# Patient Record
Sex: FEMALE | Race: WHITE | NOT HISPANIC OR LATINO | URBAN - METROPOLITAN AREA
[De-identification: names, ages, dates, MRNs, and addresses within clinical notes are randomized per-mention and may not be internally consistent; named-entity substitution may affect disease eponyms.]

---

## 2017-12-21 ENCOUNTER — INPATIENT (INPATIENT)
Facility: HOSPITAL | Age: 62
LOS: 0 days | Discharge: HOME | DRG: 381 | End: 2017-12-22
Attending: INTERNAL MEDICINE | Admitting: INTERNAL MEDICINE
Payer: COMMERCIAL

## 2017-12-21 VITALS
OXYGEN SATURATION: 100 % | RESPIRATION RATE: 16 BRPM | SYSTOLIC BLOOD PRESSURE: 136 MMHG | DIASTOLIC BLOOD PRESSURE: 89 MMHG | HEART RATE: 93 BPM | WEIGHT: 149.91 LBS | TEMPERATURE: 98 F

## 2017-12-21 DIAGNOSIS — Z98.89 OTHER SPECIFIED POSTPROCEDURAL STATES: Chronic | ICD-10-CM

## 2017-12-21 LAB
ALBUMIN SERPL ELPH-MCNC: 4 G/DL — SIGNIFICANT CHANGE UP (ref 3.3–5)
ALP SERPL-CCNC: 117 U/L — SIGNIFICANT CHANGE UP (ref 40–120)
ALT FLD-CCNC: 34 U/L — SIGNIFICANT CHANGE UP (ref 10–45)
ANION GAP SERPL CALC-SCNC: 16 MMOL/L — SIGNIFICANT CHANGE UP (ref 5–17)
APTT BLD: 34.1 SEC — SIGNIFICANT CHANGE UP (ref 27.5–37.4)
AST SERPL-CCNC: 25 U/L — SIGNIFICANT CHANGE UP (ref 10–40)
BASOPHILS NFR BLD AUTO: 0.9 % — SIGNIFICANT CHANGE UP (ref 0–2)
BILIRUB SERPL-MCNC: 0.4 MG/DL — SIGNIFICANT CHANGE UP (ref 0.2–1.2)
BUN SERPL-MCNC: 14 MG/DL — SIGNIFICANT CHANGE UP (ref 7–23)
CALCIUM SERPL-MCNC: 9.4 MG/DL — SIGNIFICANT CHANGE UP (ref 8.4–10.5)
CHLORIDE SERPL-SCNC: 105 MMOL/L — SIGNIFICANT CHANGE UP (ref 96–108)
CK MB CFR SERPL CALC: <1 NG/ML — SIGNIFICANT CHANGE UP (ref 0–6.7)
CK SERPL-CCNC: 73 U/L — SIGNIFICANT CHANGE UP (ref 25–170)
CO2 SERPL-SCNC: 23 MMOL/L — SIGNIFICANT CHANGE UP (ref 22–31)
CREAT SERPL-MCNC: 0.69 MG/DL — SIGNIFICANT CHANGE UP (ref 0.5–1.3)
EOSINOPHIL NFR BLD AUTO: 0.6 % — SIGNIFICANT CHANGE UP (ref 0–6)
GLUCOSE SERPL-MCNC: 106 MG/DL — HIGH (ref 70–99)
HCT VFR BLD CALC: 44 % — SIGNIFICANT CHANGE UP (ref 34.5–45)
HGB BLD-MCNC: 14.7 G/DL — SIGNIFICANT CHANGE UP (ref 11.5–15.5)
INR BLD: 1 — SIGNIFICANT CHANGE UP (ref 0.88–1.16)
LYMPHOCYTES # BLD AUTO: 24 % — SIGNIFICANT CHANGE UP (ref 13–44)
MCHC RBC-ENTMCNC: 26 PG — LOW (ref 27–34)
MCHC RBC-ENTMCNC: 33.4 G/DL — SIGNIFICANT CHANGE UP (ref 32–36)
MCV RBC AUTO: 77.9 FL — LOW (ref 80–100)
MONOCYTES NFR BLD AUTO: 5.5 % — SIGNIFICANT CHANGE UP (ref 2–14)
NEUTROPHILS NFR BLD AUTO: 69 % — SIGNIFICANT CHANGE UP (ref 43–77)
PLATELET # BLD AUTO: 255 K/UL — SIGNIFICANT CHANGE UP (ref 150–400)
POTASSIUM SERPL-MCNC: 3.7 MMOL/L — SIGNIFICANT CHANGE UP (ref 3.5–5.3)
POTASSIUM SERPL-SCNC: 3.7 MMOL/L — SIGNIFICANT CHANGE UP (ref 3.5–5.3)
PROT SERPL-MCNC: 7.2 G/DL — SIGNIFICANT CHANGE UP (ref 6–8.3)
PROTHROM AB SERPL-ACNC: 11.1 SEC — SIGNIFICANT CHANGE UP (ref 9.8–12.7)
RBC # BLD: 5.65 M/UL — HIGH (ref 3.8–5.2)
RBC # FLD: 17.2 % — HIGH (ref 10.3–16.9)
SODIUM SERPL-SCNC: 144 MMOL/L — SIGNIFICANT CHANGE UP (ref 135–145)
TROPONIN T SERPL-MCNC: <0.01 NG/ML — SIGNIFICANT CHANGE UP (ref 0–0.01)
WBC # BLD: 10 K/UL — SIGNIFICANT CHANGE UP (ref 3.8–10.5)
WBC # FLD AUTO: 10 K/UL — SIGNIFICANT CHANGE UP (ref 3.8–10.5)

## 2017-12-21 PROCEDURE — 99223 1ST HOSP IP/OBS HIGH 75: CPT | Mod: GC

## 2017-12-21 PROCEDURE — 93010 ELECTROCARDIOGRAM REPORT: CPT | Mod: NC

## 2017-12-21 PROCEDURE — 99285 EMERGENCY DEPT VISIT HI MDM: CPT | Mod: 25

## 2017-12-21 PROCEDURE — 71020: CPT | Mod: 26

## 2017-12-21 PROCEDURE — 99223 1ST HOSP IP/OBS HIGH 75: CPT

## 2017-12-21 PROCEDURE — 74220 X-RAY XM ESOPHAGUS 1CNTRST: CPT | Mod: 26

## 2017-12-21 RX ORDER — DEXTROSE 50 % IN WATER 50 %
25 SYRINGE (ML) INTRAVENOUS ONCE
Qty: 0 | Refills: 0 | Status: DISCONTINUED | OUTPATIENT
Start: 2017-12-21 | End: 2017-12-22

## 2017-12-21 RX ORDER — ONDANSETRON 8 MG/1
4 TABLET, FILM COATED ORAL ONCE
Qty: 0 | Refills: 0 | Status: COMPLETED | OUTPATIENT
Start: 2017-12-21 | End: 2017-12-21

## 2017-12-21 RX ORDER — GLUCAGON INJECTION, SOLUTION 0.5 MG/.1ML
0.5 INJECTION, SOLUTION SUBCUTANEOUS ONCE
Qty: 0 | Refills: 0 | Status: COMPLETED | OUTPATIENT
Start: 2017-12-21 | End: 2017-12-21

## 2017-12-21 RX ORDER — HEPARIN SODIUM 5000 [USP'U]/ML
5000 INJECTION INTRAVENOUS; SUBCUTANEOUS EVERY 8 HOURS
Qty: 0 | Refills: 0 | Status: DISCONTINUED | OUTPATIENT
Start: 2017-12-21 | End: 2017-12-22

## 2017-12-21 RX ORDER — INSULIN LISPRO 100/ML
VIAL (ML) SUBCUTANEOUS
Qty: 0 | Refills: 0 | Status: DISCONTINUED | OUTPATIENT
Start: 2017-12-21 | End: 2017-12-22

## 2017-12-21 RX ORDER — SODIUM CHLORIDE 9 MG/ML
1000 INJECTION INTRAMUSCULAR; INTRAVENOUS; SUBCUTANEOUS
Qty: 0 | Refills: 0 | Status: DISCONTINUED | OUTPATIENT
Start: 2017-12-21 | End: 2017-12-22

## 2017-12-21 RX ORDER — SODIUM CHLORIDE 9 MG/ML
1000 INJECTION, SOLUTION INTRAVENOUS
Qty: 0 | Refills: 0 | Status: DISCONTINUED | OUTPATIENT
Start: 2017-12-21 | End: 2017-12-22

## 2017-12-21 RX ORDER — GLUCAGON INJECTION, SOLUTION 0.5 MG/.1ML
1 INJECTION, SOLUTION SUBCUTANEOUS ONCE
Qty: 0 | Refills: 0 | Status: DISCONTINUED | OUTPATIENT
Start: 2017-12-21 | End: 2017-12-22

## 2017-12-21 RX ORDER — DEXTROSE 50 % IN WATER 50 %
1 SYRINGE (ML) INTRAVENOUS ONCE
Qty: 0 | Refills: 0 | Status: DISCONTINUED | OUTPATIENT
Start: 2017-12-21 | End: 2017-12-22

## 2017-12-21 RX ORDER — DEXTROSE 50 % IN WATER 50 %
12.5 SYRINGE (ML) INTRAVENOUS ONCE
Qty: 0 | Refills: 0 | Status: DISCONTINUED | OUTPATIENT
Start: 2017-12-21 | End: 2017-12-22

## 2017-12-21 RX ADMIN — GLUCAGON INJECTION, SOLUTION 0.5 MILLIGRAM(S): 0.5 INJECTION, SOLUTION SUBCUTANEOUS at 21:58

## 2017-12-21 RX ADMIN — SODIUM CHLORIDE 125 MILLILITER(S): 9 INJECTION INTRAMUSCULAR; INTRAVENOUS; SUBCUTANEOUS at 21:25

## 2017-12-21 RX ADMIN — HEPARIN SODIUM 5000 UNIT(S): 5000 INJECTION INTRAVENOUS; SUBCUTANEOUS at 22:36

## 2017-12-21 RX ADMIN — ONDANSETRON 4 MILLIGRAM(S): 8 TABLET, FILM COATED ORAL at 21:41

## 2017-12-21 NOTE — ED PROVIDER NOTE - ENMT, MLM
Airway patent, Nasal mucosa clear. Mouth with normal mucosa. no FB visualized in mouth. Tolerating secretions w/o difficulty. No drooling or stridor

## 2017-12-21 NOTE — H&P ADULT - NSHPLABSRESULTS_GEN_ALL_CORE
14.7   10.0  )-----------( 255      ( 21 Dec 2017 15:58 )             44.0     12-21    144  |  105  |  14  ----------------------------<  106<H>  3.7   |  23  |  0.69    Ca    9.4      21 Dec 2017 15:58    TPro  7.2  /  Alb  4.0  /  TBili  0.4  /  DBili  x   /  AST  25  /  ALT  34  /  AlkPhos  117  12-21    PT/INR - ( 21 Dec 2017 15:58 )   PT: 11.1 sec;   INR: 1.00          PTT - ( 21 Dec 2017 15:58 )  PTT:34.1 sec    CARDIAC MARKERS ( 21 Dec 2017 15:58 )  x     / <0.01 ng/mL / 73 U/L / x     / <1.0 ng/mL      RADIOLOGY, EKG & ADDITIONAL TESTS: Reviewed.  < from: Xray Chest 2 Views PA/Lat (12.21.17 @ 16:55) >    No active disease in the chest. Mild elevation of the left hemidiaphragm, of unclear etiology. Previous hiatal hernia is not identified on the current exam. Correlate with patient's surgical history.    < from: Xray Esophagram (12.21.17 @ 18:15) >    No passage of contrast from the distal esophagus into the stomach. The   patient regurgitated the contrast material and the study could not be   completed. No definite hiatal hernia was identified. Recommend CT of the   chest to rule out obstructing mass.    < end of copied text >    EKG: NSR

## 2017-12-21 NOTE — ED ADULT NURSE REASSESSMENT NOTE - NS ED NURSE REASSESS COMMENT FT1
Pt currently spitting up clear liquid. VS stable at this time. Suction set up at bedside. Dr. Hamilton endorsed. Verbal order of Zofran 4mg given IV. GI consult currently at bedside. Awaiting further orders. Will continue to monitor. Primary RN Joe endorsed.

## 2017-12-21 NOTE — H&P ADULT - NSHPPHYSICALEXAM_GEN_ALL_CORE
Constitutional: WDWN resting comfortably in bed; NAD  Head: NC/AT  Eyes: PERRL, EOMI, anicteric sclera  ENT: no nasal discharge; uvula midline, no oropharyngeal erythema or exudates; MMM  Neck: supple; no JVD or thyromegaly  Respiratory: CTA B/L; no W/R/R, no retractions. no increased work of breathing. able to speak in full sentences and tolerating secretions well  Cardiac: +S1/S2; RRR; no M/R/G; PMI non-displaced  Gastrointestinal: epigastric tenderness. No food particles noted in oropharynx. abd was soft, ND; no rebound or guarding; +BSx4  Back: spine midline, no bony tenderness or step-offs; no CVAT B/L  Extremities: WWP, no clubbing or cyanosis; no peripheral edema  Musculoskeletal: NROM x4; no joint swelling, tenderness or erythema  Vascular: 2+ radial, femoral, DP/PT pulses B/L  Dermatologic: skin warm, dry and intact; no rashes, wounds, or scars  Lymphatic: no submandibular or cervical LAD  Neurologic: AAOx3; CNII-XII grossly intact; no focal deficits  Psychiatric: affect and characteristics of appearance, verbalizations, behaviors are appropriate

## 2017-12-21 NOTE — ED ADULT NURSE NOTE - OBJECTIVE STATEMENT
61y F, A&ox3, presents to ED for chest pressure x2 days after eating chicken wings. States pressure to chest midsternal worsening after eating radiating to back, pt reports removal of galbladder previously. No active n/v, no sob, no fever, no chills, no cough, no numbness nor tingling. Lungs clear bilateral, no jvd, no edema. +pedal pulses.

## 2017-12-21 NOTE — H&P ADULT - PROBLEM SELECTOR PLAN 1
Patient is comfortable, tolerating secretions and without any respiratory distress.  -NPO, aspiration precautions  -0.5mg glucagon q1hr PRN  -Zofran PRN (QTc 447)  -EGD in AM Patient is comfortable, tolerating secretions and without any respiratory distress. Etiology includes strictures from long-standing GERD, autoimmune process (pemphigus), obstruction from recurrent hiatal hernia or a new mass. Other etiologies include Schatzki ring, esophageal web, achalasia or spasm  -NPO, aspiration precautions  -0.5mg glucagon q1hr PRN  -Zofran PRN (QTc 447)  -EGD in AM

## 2017-12-21 NOTE — ED PROVIDER NOTE - OBJECTIVE STATEMENT
Pt w/ PMHx pemphigus vulgaris, GERD, HLD, renal colic, steroid-induced DM, PSHx cholecystectomy, hiatal hernia repair (2014) p/w inability to swallow x 2 days. Pt reports whenever she eats solids, it feels stuck in her esophagus, and feels associated pressure in her substernal chest and back. Pt reports she can swallow liquids, as well as bread, and fruit. This evening, pt ate a single bite of steak. Pt had the same painful sensation, and just minutes to arrival, pt spit up a large (approx 4 cm) piece of steak (in emesis basin next to pt). Pt reports since she spit this up, she is feeling much better, and has no pain. No drooling, stridor. No prior hx.

## 2017-12-21 NOTE — H&P ADULT - HISTORY OF PRESENT ILLNESS
Patient is a 61 year old female with a PMHx of DM, HLD, osteoporosis and GERD who presented to the ED with Patient is a 61 year old female with a PMHx of a strangulated hiatal hernia s/p repair several years ago, DM, HLD, osteoporosis and GERD who presented to the ED with two days of dysphagia. The patient reports noticing difficulty swallowing food two days ago that has since progressed to liquids today. Today she ate two pieces of small steak and immediately felt that something was stuck in her throat. She came to the ED and immediately vomited up one piece and complained that she still felt something stuck in her chest. The patient also described a pain in her back that she thinks is related to the food.    In the ED, VS were T 97.8 HR 93 /89 RR 16 O2 100 on RA. Xray esophagram revealed no passage of contrast from the distal esophagus into the stomach. She regurgitated the contrast material so the study could not be completed. No definite hiatal hernia was identified. Her symptoms worsened immediately after esophagram but improved with IV glucagon. GI evaluated the patient and will plan for EGD in the AM.

## 2017-12-21 NOTE — H&P ADULT - PROBLEM SELECTOR PLAN 2
Patient reports having EGD several years ago and diagnosed her with a strangulated hiatal hernia that has since been repaired. Symptoms have been controlled with omeprazole (currently holding while NPO

## 2017-12-21 NOTE — CONSULT NOTE ADULT - SUBJECTIVE AND OBJECTIVE BOX
HPI:  Patient is a 61 year old female with a PMHx of DM, HLD, osteoporosis and GERD who presented to the ED with for in ability to tolerate PO. Pt states she had steak yesterday, and felt as if it was lodged into her throat. She was able to tolerate liquids and soft foods but still felt globus sensation today.  While in ER she was able to tolerate liquids, but after barium esophagram she felt as if she could not tolerate anything PO. she denies CP, SOB. While in ER she tolerated water but then felt like she needed to vomit.     of note, pt reports similar episodes to meats that self resolve       PAST MEDICAL & SURGICAL HISTORY:  Renal colic  Hypercholesterolemia  Osteoporosis  Pemphigus vulgaris  Reflux  Diabetes  H/O hernia repair  History of cholecystectomy    	    MEDICATIONS  (STANDING):  dextrose 5%. 1000 milliLiter(s) (50 mL/Hr) IV Continuous <Continuous>  dextrose 50% Injectable 12.5 Gram(s) IV Push once  dextrose 50% Injectable 25 Gram(s) IV Push once  dextrose 50% Injectable 25 Gram(s) IV Push once  glucagon  Injectable 0.5 milliGRAM(s) IV Push once  heparin  Injectable 5000 Unit(s) SubCutaneous every 8 hours  insulin lispro (HumaLOG) corrective regimen sliding scale   SubCutaneous Before meals and at bedtime  LORazepam   Injectable 0.5 milliGRAM(s) IV Push once  sodium chloride 0.9%. 1000 milliLiter(s) (125 mL/Hr) IV Continuous <Continuous>    MEDICATIONS  (PRN):  dextrose Gel 1 Dose(s) Oral once PRN Blood Glucose LESS THAN 70 milliGRAM(s)/deciliter  glucagon  Injectable 1 milliGRAM(s) IntraMuscular once PRN Glucose LESS THAN 70 milligrams/deciliter      Allergies    propylthiouracil (Hives)    Intolerances        SOCIAL HISTORY:    FAMILY HISTORY:  No pertinent family history in first degree relatives      Vital Signs Last 24 Hrs  T(C): 36.7 (21 Dec 2017 21:26), Max: 36.7 (21 Dec 2017 19:37)  T(F): 98.1 (21 Dec 2017 21:26), Max: 98.1 (21 Dec 2017 21:26)  HR: 90 (21 Dec 2017 21:26) (90 - 93)  BP: 145/87 (21 Dec 2017 21:26) (120/84 - 145/87)  BP(mean): --  RR: 16 (21 Dec 2017 21:26) (16 - 16)  SpO2: 97% (21 Dec 2017 21:26) (97% - 100%)    PHYSICAL EXAM:    GEN: Anxious, AOx3  HEENTL anicteric  CHEST: no w.r.r  CVS: no m/r/g  ABD: soft, nt nd bs+    LABS:                        14.7   10.0  )-----------( 255      ( 21 Dec 2017 15:58 )             44.0     12-21    144  |  105  |  14  ----------------------------<  106<H>  3.7   |  23  |  0.69    Ca    9.4      21 Dec 2017 15:58    TPro  7.2  /  Alb  4.0  /  TBili  0.4  /  DBili  x   /  AST  25  /  ALT  34  /  AlkPhos  117  12-21    PT/INR - ( 21 Dec 2017 15:58 )   PT: 11.1 sec;   INR: 1.00          PTT - ( 21 Dec 2017 15:58 )  PTT:34.1 sec      RADIOLOGY & ADDITIONAL STUDIES:

## 2017-12-21 NOTE — H&P ADULT - NSHPREVIEWOFSYSTEMS_GEN_ALL_CORE
CONSTITUTIONAL: No weakness, fevers or chills  EYES/ENT: No visual changes;  No vertigo or throat pain   NECK: No pain or stiffness  RESPIRATORY: No cough, wheezing, hemoptysis; No shortness of breath  CARDIOVASCULAR: No chest pain or palpitations  GASTROINTESTINAL: see HPI   GENITOURINARY: No dysuria, frequency or hematuria  NEUROLOGICAL: No numbness or weakness  SKIN: No itching, burning, rashes, or lesions   All other review of systems is negative unless indicated above.

## 2017-12-21 NOTE — H&P ADULT - ASSESSMENT
Patient is a 61 year old female with a PMHx of a strangulated hiatal hernia s/p repair several years ago, DM, HLD, osteoporosis and GERD who presented to the ED with food impaction

## 2017-12-21 NOTE — ED PROVIDER NOTE - PROGRESS NOTE DETAILS
D/w GI fellow Bob: hold on CT. pt needs EGD. Keep NPO. For EGD in the morning Pt now w/ vomiting, same pain, feels something else is stuck there. Pt now reports she ate 2 pieces of meat. GI re-called. Requested evaluation for possible EGD tonight. Pt to come in and see the pt

## 2017-12-22 ENCOUNTER — TRANSCRIPTION ENCOUNTER (OUTPATIENT)
Age: 62
End: 2017-12-22

## 2017-12-22 ENCOUNTER — RESULT REVIEW (OUTPATIENT)
Age: 62
End: 2017-12-22

## 2017-12-22 VITALS
HEART RATE: 93 BPM | TEMPERATURE: 98 F | OXYGEN SATURATION: 94 % | SYSTOLIC BLOOD PRESSURE: 120 MMHG | RESPIRATION RATE: 18 BRPM | DIASTOLIC BLOOD PRESSURE: 74 MMHG

## 2017-12-22 DIAGNOSIS — R13.10 DYSPHAGIA, UNSPECIFIED: ICD-10-CM

## 2017-12-22 DIAGNOSIS — K21.9 GASTRO-ESOPHAGEAL REFLUX DISEASE WITHOUT ESOPHAGITIS: ICD-10-CM

## 2017-12-22 DIAGNOSIS — E11.9 TYPE 2 DIABETES MELLITUS WITHOUT COMPLICATIONS: ICD-10-CM

## 2017-12-22 DIAGNOSIS — E03.9 HYPOTHYROIDISM, UNSPECIFIED: ICD-10-CM

## 2017-12-22 DIAGNOSIS — L10.0 PEMPHIGUS VULGARIS: ICD-10-CM

## 2017-12-22 DIAGNOSIS — M81.0 AGE-RELATED OSTEOPOROSIS WITHOUT CURRENT PATHOLOGICAL FRACTURE: ICD-10-CM

## 2017-12-22 DIAGNOSIS — E78.00 PURE HYPERCHOLESTEROLEMIA, UNSPECIFIED: ICD-10-CM

## 2017-12-22 DIAGNOSIS — K44.9 DIAPHRAGMATIC HERNIA WITHOUT OBSTRUCTION OR GANGRENE: ICD-10-CM

## 2017-12-22 DIAGNOSIS — Z29.9 ENCOUNTER FOR PROPHYLACTIC MEASURES, UNSPECIFIED: ICD-10-CM

## 2017-12-22 LAB
ANION GAP SERPL CALC-SCNC: 14 MMOL/L — SIGNIFICANT CHANGE UP (ref 5–17)
APTT BLD: 33 SEC — SIGNIFICANT CHANGE UP (ref 27.5–37.4)
BASOPHILS NFR BLD AUTO: 0.5 % — SIGNIFICANT CHANGE UP (ref 0–2)
BUN SERPL-MCNC: 17 MG/DL — SIGNIFICANT CHANGE UP (ref 7–23)
CALCIUM SERPL-MCNC: 8.4 MG/DL — SIGNIFICANT CHANGE UP (ref 8.4–10.5)
CHLORIDE SERPL-SCNC: 107 MMOL/L — SIGNIFICANT CHANGE UP (ref 96–108)
CO2 SERPL-SCNC: 25 MMOL/L — SIGNIFICANT CHANGE UP (ref 22–31)
CREAT SERPL-MCNC: 0.7 MG/DL — SIGNIFICANT CHANGE UP (ref 0.5–1.3)
EOSINOPHIL NFR BLD AUTO: 0.9 % — SIGNIFICANT CHANGE UP (ref 0–6)
GLUCOSE SERPL-MCNC: 71 MG/DL — SIGNIFICANT CHANGE UP (ref 70–99)
HBA1C BLD-MCNC: 5.8 % — HIGH (ref 4–5.6)
HCT VFR BLD CALC: 38.9 % — SIGNIFICANT CHANGE UP (ref 34.5–45)
HCV AB S/CO SERPL IA: 0.14 S/CO — SIGNIFICANT CHANGE UP
HCV AB SERPL-IMP: SIGNIFICANT CHANGE UP
HGB BLD-MCNC: 12.7 G/DL — SIGNIFICANT CHANGE UP (ref 11.5–15.5)
INR BLD: 1.12 — SIGNIFICANT CHANGE UP (ref 0.88–1.16)
LYMPHOCYTES # BLD AUTO: 26.2 % — SIGNIFICANT CHANGE UP (ref 13–44)
MAGNESIUM SERPL-MCNC: 2.1 MG/DL — SIGNIFICANT CHANGE UP (ref 1.6–2.6)
MCHC RBC-ENTMCNC: 25.9 PG — LOW (ref 27–34)
MCHC RBC-ENTMCNC: 32.6 G/DL — SIGNIFICANT CHANGE UP (ref 32–36)
MCV RBC AUTO: 79.2 FL — LOW (ref 80–100)
MONOCYTES NFR BLD AUTO: 7.8 % — SIGNIFICANT CHANGE UP (ref 2–14)
NEUTROPHILS NFR BLD AUTO: 64.6 % — SIGNIFICANT CHANGE UP (ref 43–77)
PLATELET # BLD AUTO: 222 K/UL — SIGNIFICANT CHANGE UP (ref 150–400)
POTASSIUM SERPL-MCNC: 3.6 MMOL/L — SIGNIFICANT CHANGE UP (ref 3.5–5.3)
POTASSIUM SERPL-SCNC: 3.6 MMOL/L — SIGNIFICANT CHANGE UP (ref 3.5–5.3)
PROTHROM AB SERPL-ACNC: 12.5 SEC — SIGNIFICANT CHANGE UP (ref 9.8–12.7)
RBC # BLD: 4.91 M/UL — SIGNIFICANT CHANGE UP (ref 3.8–5.2)
RBC # FLD: 17.5 % — HIGH (ref 10.3–16.9)
SODIUM SERPL-SCNC: 146 MMOL/L — HIGH (ref 135–145)
WBC # BLD: 9.4 K/UL — SIGNIFICANT CHANGE UP (ref 3.8–10.5)
WBC # FLD AUTO: 9.4 K/UL — SIGNIFICANT CHANGE UP (ref 3.8–10.5)

## 2017-12-22 PROCEDURE — 99285 EMERGENCY DEPT VISIT HI MDM: CPT | Mod: 25

## 2017-12-22 PROCEDURE — 93005 ELECTROCARDIOGRAM TRACING: CPT

## 2017-12-22 PROCEDURE — 84484 ASSAY OF TROPONIN QUANT: CPT

## 2017-12-22 PROCEDURE — 82553 CREATINE MB FRACTION: CPT

## 2017-12-22 PROCEDURE — 88305 TISSUE EXAM BY PATHOLOGIST: CPT

## 2017-12-22 PROCEDURE — 85610 PROTHROMBIN TIME: CPT

## 2017-12-22 PROCEDURE — 86803 HEPATITIS C AB TEST: CPT

## 2017-12-22 PROCEDURE — 36415 COLL VENOUS BLD VENIPUNCTURE: CPT

## 2017-12-22 PROCEDURE — 83735 ASSAY OF MAGNESIUM: CPT

## 2017-12-22 PROCEDURE — 71046 X-RAY EXAM CHEST 2 VIEWS: CPT

## 2017-12-22 PROCEDURE — 82550 ASSAY OF CK (CPK): CPT

## 2017-12-22 PROCEDURE — 85730 THROMBOPLASTIN TIME PARTIAL: CPT

## 2017-12-22 PROCEDURE — 74220 X-RAY XM ESOPHAGUS 1CNTRST: CPT

## 2017-12-22 PROCEDURE — 83036 HEMOGLOBIN GLYCOSYLATED A1C: CPT

## 2017-12-22 PROCEDURE — 80048 BASIC METABOLIC PNL TOTAL CA: CPT

## 2017-12-22 PROCEDURE — 80053 COMPREHEN METABOLIC PANEL: CPT

## 2017-12-22 PROCEDURE — 82962 GLUCOSE BLOOD TEST: CPT

## 2017-12-22 PROCEDURE — 85025 COMPLETE CBC W/AUTO DIFF WBC: CPT

## 2017-12-22 PROCEDURE — 99239 HOSP IP/OBS DSCHRG MGMT >30: CPT

## 2017-12-22 RX ORDER — CHOLECALCIFEROL (VITAMIN D3) 125 MCG
1 CAPSULE ORAL
Qty: 0 | Refills: 0 | COMMUNITY

## 2017-12-22 RX ORDER — ONDANSETRON 8 MG/1
4 TABLET, FILM COATED ORAL EVERY 6 HOURS
Qty: 0 | Refills: 0 | Status: DISCONTINUED | OUTPATIENT
Start: 2017-12-22 | End: 2017-12-22

## 2017-12-22 RX ORDER — GLUCAGON INJECTION, SOLUTION 0.5 MG/.1ML
0.5 INJECTION, SOLUTION SUBCUTANEOUS
Qty: 0 | Refills: 0 | Status: DISCONTINUED | OUTPATIENT
Start: 2017-12-22 | End: 2017-12-22

## 2017-12-22 RX ORDER — OMEPRAZOLE 10 MG/1
20 CAPSULE, DELAYED RELEASE ORAL
Qty: 0 | Refills: 0 | COMMUNITY

## 2017-12-22 RX ORDER — PANTOPRAZOLE SODIUM 20 MG/1
40 TABLET, DELAYED RELEASE ORAL DAILY
Qty: 0 | Refills: 0 | Status: DISCONTINUED | OUTPATIENT
Start: 2017-12-23 | End: 2017-12-22

## 2017-12-22 RX ORDER — TRAZODONE HCL 50 MG
50 TABLET ORAL
Qty: 0 | Refills: 0 | COMMUNITY

## 2017-12-22 RX ORDER — PANTOPRAZOLE SODIUM 20 MG/1
40 TABLET, DELAYED RELEASE ORAL ONCE
Qty: 0 | Refills: 0 | Status: COMPLETED | OUTPATIENT
Start: 2017-12-22 | End: 2017-12-22

## 2017-12-22 RX ORDER — DEXTROSE 50 % IN WATER 50 %
12.5 SYRINGE (ML) INTRAVENOUS ONCE
Qty: 0 | Refills: 0 | Status: DISCONTINUED | OUTPATIENT
Start: 2017-12-22 | End: 2017-12-22

## 2017-12-22 RX ORDER — LEVOTHYROXINE SODIUM 125 MCG
56 TABLET ORAL
Qty: 0 | Refills: 0 | Status: DISCONTINUED | OUTPATIENT
Start: 2017-12-22 | End: 2017-12-22

## 2017-12-22 RX ORDER — LEVOTHYROXINE SODIUM 125 MCG
112 TABLET ORAL
Qty: 0 | Refills: 0 | COMMUNITY

## 2017-12-22 RX ORDER — PANTOPRAZOLE SODIUM 20 MG/1
TABLET, DELAYED RELEASE ORAL
Qty: 0 | Refills: 0 | Status: DISCONTINUED | OUTPATIENT
Start: 2017-12-22 | End: 2017-12-22

## 2017-12-22 RX ORDER — SCOPALAMINE 1 MG/3D
1.5 PATCH, EXTENDED RELEASE TRANSDERMAL ONCE
Qty: 0 | Refills: 0 | Status: COMPLETED | OUTPATIENT
Start: 2017-12-22 | End: 2017-12-22

## 2017-12-22 RX ORDER — SIMVASTATIN 20 MG/1
40 TABLET, FILM COATED ORAL
Qty: 0 | Refills: 0 | COMMUNITY

## 2017-12-22 RX ORDER — LANOLIN ALCOHOL/MO/W.PET/CERES
1 CREAM (GRAM) TOPICAL
Qty: 0 | Refills: 0 | COMMUNITY

## 2017-12-22 RX ORDER — ASPIRIN/CALCIUM CARB/MAGNESIUM 324 MG
1 TABLET ORAL
Qty: 0 | Refills: 0 | COMMUNITY

## 2017-12-22 RX ORDER — LIRAGLUTIDE 6 MG/ML
6 INJECTION SUBCUTANEOUS
Qty: 0 | Refills: 0 | COMMUNITY

## 2017-12-22 RX ORDER — LACTOBACILLUS ACIDOPHILUS 100MM CELL
2 CAPSULE ORAL
Qty: 0 | Refills: 0 | COMMUNITY

## 2017-12-22 RX ADMIN — ONDANSETRON 4 MILLIGRAM(S): 8 TABLET, FILM COATED ORAL at 00:53

## 2017-12-22 RX ADMIN — SCOPALAMINE 1.5 MILLIGRAM(S): 1 PATCH, EXTENDED RELEASE TRANSDERMAL at 12:01

## 2017-12-22 RX ADMIN — PANTOPRAZOLE SODIUM 40 MILLIGRAM(S): 20 TABLET, DELAYED RELEASE ORAL at 15:30

## 2017-12-22 RX ADMIN — GLUCAGON INJECTION, SOLUTION 0.5 MILLIGRAM(S): 0.5 INJECTION, SOLUTION SUBCUTANEOUS at 01:35

## 2017-12-22 RX ADMIN — Medication 56 MICROGRAM(S): at 06:31

## 2017-12-22 RX ADMIN — GLUCAGON INJECTION, SOLUTION 0.5 MILLIGRAM(S): 0.5 INJECTION, SOLUTION SUBCUTANEOUS at 04:39

## 2017-12-22 RX ADMIN — HEPARIN SODIUM 5000 UNIT(S): 5000 INJECTION INTRAVENOUS; SUBCUTANEOUS at 06:31

## 2017-12-22 NOTE — CONSULT NOTE ADULT - ASSESSMENT
61 year old female with presents with dysphagia to solids after eating steak, with witnessed vomitus with steak. Pt will require endoscopic evaluation to r/o e.o.e    1. ? food impaction   - NPO  -Aspiration precautions  -0.5 mg glucagon q 1hrs  -1 mg ativan q 30 min  -encourage ambulation   -Plan for EGD     GI following
60 y/o female with previous hernia repair by Dr. Carlyle Espinosa, now here wtih dysphagia after eating steak; Xray esophagram significant for no passage of contrast past the distal esophagus.

## 2017-12-22 NOTE — DISCHARGE NOTE ADULT - PATIENT PORTAL LINK FT
“You can access the [employer] Patient Portal, offered by [facility], by registering with the following website: [email or web address]”

## 2017-12-22 NOTE — PROGRESS NOTE ADULT - PROBLEM SELECTOR PLAN 2
Patient reports having EGD several years ago and diagnosed her with a strangulated hiatal hernia that has since been repaired. Symptoms have been controlled with omeprazole  -IV PPI daily, plan to transition to PO

## 2017-12-22 NOTE — CONSULT NOTE ADULT - SUBJECTIVE AND OBJECTIVE BOX
Surgeon: Carlyle Siegel    Requesting Physician: Dr. Gleason    HISTORY OF PRESENT ILLNESS:  This is a 60 y/o female, known to Dr. Espinosa who performed her hernia repair "years ago".  We were notified that the patient is in the hospital with esophageal issues and will follow with the primary team while she is here.      From H&P:  She is a 61 year old female with a PMHx of a strangulated hiatal hernia s/p repair several years ago, DM, HLD, osteoporosis and GERD who presented to the ED with two days of dysphagia. The patient reports noticing difficulty swallowing food two days ago that has since progressed to liquids today. Today she ate two pieces of small steak and immediately felt that something was stuck in her throat. She came to the ED and immediately vomited up one piece and complained that she still felt something stuck in her chest. The patient also described a pain in her back that she thinks is related to the food.     Current HPI: An esophagram revealed no passage of contrast after the distal esophagus into the stomach.  Working diagnosis of food impaction.  Plan is for EGD today [facility].  Currently,     PAST MEDICAL & SURGICAL HISTORY:  Renal colic  Hypercholesterolemia  Osteoporosis  Pemphigus vulgaris  Reflux  Diabetes  H/O hernia repair  History of cholecystectomy      MEDICATIONS  (STANDING):  dextrose 5%. 1000 milliLiter(s) (50 mL/Hr) IV Continuous <Continuous>  dextrose 50% Injectable 12.5 Gram(s) IV Push once  dextrose 50% Injectable 25 Gram(s) IV Push once  dextrose 50% Injectable 25 Gram(s) IV Push once  heparin  Injectable 5000 Unit(s) SubCutaneous every 8 hours  insulin lispro (HumaLOG) corrective regimen sliding scale   SubCutaneous Before meals and at bedtime  levothyroxine Injectable 56 MICROGram(s) IV Push <User Schedule>  LORazepam   Injectable 0.5 milliGRAM(s) IV Push once  sodium chloride 0.9%. 1000 milliLiter(s) (125 mL/Hr) IV Continuous <Continuous>    MEDICATIONS  (PRN):  dextrose Gel 1 Dose(s) Oral once PRN Blood Glucose LESS THAN 70 milliGRAM(s)/deciliter  glucagon  Injectable 1 milliGRAM(s) IntraMuscular once PRN Glucose LESS THAN 70 milligrams/deciliter  glucagon  Injectable 0.5 milliGRAM(s) IV Push every 1 hour PRN nausea, vomiting, globus sensation  ondansetron Injectable 4 milliGRAM(s) IV Push every 6 hours PRN Nausea and/or Vomiting      Allergies    propylthiouracil (Hives)    Intolerances        SOCIAL HISTORY:  Smoker:  YES / NO        PACK YEARS:                         WHEN QUIT?  ETOH use:  YES / NO               FREQUENCY / QUANTITY:  Ilicit Drug use:  YES / NO  Occupation:  Assisted device use (Cane / Walker):  Live with:    FAMILY HISTORY:  No pertinent family history in first degree relatives      Review of Systems  CONSTITUTIONAL:  Fevers / chills, sweats, fatigue, weight loss, weight gain                                    NEGATIVE  NEURO:  parathesias, seizures, syncope, confusion                                                                               NEGATIVE  EYES:  Blurry vision, discharge, pain, loss of vision                                                                                  NEGATIVE  ENMT:  Difficulty hearing, vertigo, dysphagia, epistaxis, recent dental work                                     NEGATIVE  CV:  Chest pain, palpitations, BARROW, orthopnea                                                                                         NEGATIVE  RESPIRATORY:  Wheezing, SOB, cough / sputum, hemoptysis                                                              NEGATIVE  GI:  Nausea, vommiting, diarrhea, constipation, melena                                                                        NEGATIVE  : Hematuria, dysuria, urgency, incontinence                                                                                       NEGATIVE  MUSKULOSKELETAL:  arthritis, joint swelling, muscle weakness                                                           NEGATIVE  SKIN/BREAST:  rash, itching, anoop loss, masses                                                                                            NEGATIVE  PSYCH:  depresion, anxiety, suicidal ideation                                                                                             NEGATIVE  HEME/LYMPH:  bruises easily, enlarged lymph nodes, tender lymph nodes                                        NEGATIVE  ENDOCRINE:  cold intolerance, heat intolerance, polydipsia                                                                   NEGATIVE    PHYSICAL EXAM  Vital Signs Last 24 Hrs  T(C): 36.7 (22 Dec 2017 05:05), Max: 36.7 (21 Dec 2017 19:37)  T(F): 98 (22 Dec 2017 05:05), Max: 98.1 (21 Dec 2017 21:26)  HR: 89 (22 Dec 2017 05:05) (89 - 111)  BP: 114/74 (22 Dec 2017 05:05) (114/74 - 145/87)  BP(mean): --  RR: 16 (22 Dec 2017 05:05) (16 - 18)  SpO2: 96% (22 Dec 2017 05:05) (94% - 100%)    CONSTITUTIONAL:                                                                          WNL  NEURO:                                                                                             WNL                      EYES:                                                                                                  WNL  ENMT:                                                                                                WNL  CV:                                                                                                      WNL  RESPIRATORY:                                                                                  WNL  GI:                                                                                                       WNL  : MERIDA + / -                                                                                 WNL  MUSKULOSKELETAL:                                                                       WNL  SKIN / BREAST:                                                                                 WNL                                                          LABS:                        12.7   9.4   )-----------( 222      ( 22 Dec 2017 08:11 )             38.9     12-21    144  |  105  |  14  ----------------------------<  106<H>  3.7   |  23  |  0.69    Ca    9.4      21 Dec 2017 15:58    TPro  7.2  /  Alb  4.0  /  TBili  0.4  /  DBili  x   /  AST  25  /  ALT  34  /  AlkPhos  117  12-21    PT/INR - ( 21 Dec 2017 15:58 )   PT: 11.1 sec;   INR: 1.00          PTT - ( 21 Dec 2017 15:58 )  PTT:34.1 sec    CARDIAC MARKERS ( 21 Dec 2017 15:58 )  x     / <0.01 ng/mL / 73 U/L / x     / <1.0 ng/mL          RADIOLOGY & ADDITIONAL STUDIES:  < from: Xray Esophagram (12.21.17 @ 18:15) >    No passage of contrast from the distal esophagus into the stomach. The   patient regurgitated the contrast material and the study could not be   completed. No definite hiatal hernia was identified. Recommend CT of the   chest to rule out obstructing mass.      < end of copied text >  < from: Xray Chest 2 Views PA/Lat (12.21.17 @ 16:55) >  IMPRESSION:  No active disease in the chest.    Mild elevation of the left hemidiaphragm, of unclear etiology.    Previous hiatal hernia is not identified on the current exam. Correlate   with patient's surgical history.    < end of copied text > Surgeon: Carlyle Siegel    Requesting Physician: Dr. Gleason    HISTORY OF PRESENT ILLNESS:  This is a 60 y/o female, known to Dr. Espinosa who performed her hernia repair "years ago".  We were notified that the patient is in the hospital with esophageal issues and will follow with the primary team while she is here.      From H&P:  She is a 61 year old female with a PMHx of a strangulated hiatal hernia s/p repair several years ago, DM, HLD, osteoporosis and GERD who presented to the ED with two days of dysphagia. The patient reports noticing difficulty swallowing food two days ago that has since progressed to liquids today. Today she ate two pieces of small steak and immediately felt that something was stuck in her throat. She came to the ED and immediately vomited up one piece and complained that she still felt something stuck in her chest. The patient also described a pain in her back that she thinks is related to the food.     Current HPI: An esophagram revealed no passage of contrast after the distal esophagus into the stomach.  Working diagnosis of food impaction.  Plan is for EGD today [facility].  Currently, she denies     PAST MEDICAL & SURGICAL HISTORY:  Renal colic  Hypercholesterolemia  Osteoporosis  Pemphigus vulgaris  Reflux  Diabetes  H/O hernia repair  History of cholecystectomy      MEDICATIONS  (STANDING):  dextrose 5%. 1000 milliLiter(s) (50 mL/Hr) IV Continuous <Continuous>  dextrose 50% Injectable 12.5 Gram(s) IV Push once  dextrose 50% Injectable 25 Gram(s) IV Push once  dextrose 50% Injectable 25 Gram(s) IV Push once  heparin  Injectable 5000 Unit(s) SubCutaneous every 8 hours  insulin lispro (HumaLOG) corrective regimen sliding scale   SubCutaneous Before meals and at bedtime  levothyroxine Injectable 56 MICROGram(s) IV Push <User Schedule>  LORazepam   Injectable 0.5 milliGRAM(s) IV Push once  sodium chloride 0.9%. 1000 milliLiter(s) (125 mL/Hr) IV Continuous <Continuous>    MEDICATIONS  (PRN):  dextrose Gel 1 Dose(s) Oral once PRN Blood Glucose LESS THAN 70 milliGRAM(s)/deciliter  glucagon  Injectable 1 milliGRAM(s) IntraMuscular once PRN Glucose LESS THAN 70 milligrams/deciliter  glucagon  Injectable 0.5 milliGRAM(s) IV Push every 1 hour PRN nausea, vomiting, globus sensation  ondansetron Injectable 4 milliGRAM(s) IV Push every 6 hours PRN Nausea and/or Vomiting      Allergies    propylthiouracil (Hives)    Intolerances        SOCIAL HISTORY:  Smoker:  YES / NO        PACK YEARS:                         WHEN QUIT?  ETOH use:  YES / NO               FREQUENCY / QUANTITY:  Ilicit Drug use:  YES / NO  Occupation:  Assisted device use (Cane / Walker):  Live with:    FAMILY HISTORY:  No pertinent family history in first degree relatives      Review of Systems  CONSTITUTIONAL:  Fevers / chills, sweats, fatigue, weight loss, weight gain                                    NEGATIVE  NEURO:  parathesias, seizures, syncope, confusion                                                                               NEGATIVE  EYES:  Blurry vision, discharge, pain, loss of vision                                                                                  NEGATIVE  ENMT:  Difficulty hearing, vertigo, dysphagia, epistaxis, recent dental work                                     NEGATIVE  CV:  Chest pain, palpitations, BARROW, orthopnea                                                                                         NEGATIVE  RESPIRATORY:  Wheezing, SOB, cough / sputum, hemoptysis                                                              NEGATIVE  GI:  Nausea, vommiting, diarrhea, constipation, melena                                                                        NEGATIVE  : Hematuria, dysuria, urgency, incontinence                                                                                       NEGATIVE  MUSKULOSKELETAL:  arthritis, joint swelling, muscle weakness                                                           NEGATIVE  SKIN/BREAST:  rash, itching, anoop loss, masses                                                                                            NEGATIVE  PSYCH:  depresion, anxiety, suicidal ideation                                                                                             NEGATIVE  HEME/LYMPH:  bruises easily, enlarged lymph nodes, tender lymph nodes                                        NEGATIVE  ENDOCRINE:  cold intolerance, heat intolerance, polydipsia                                                                   NEGATIVE    PHYSICAL EXAM  Vital Signs Last 24 Hrs  T(C): 36.7 (22 Dec 2017 05:05), Max: 36.7 (21 Dec 2017 19:37)  T(F): 98 (22 Dec 2017 05:05), Max: 98.1 (21 Dec 2017 21:26)  HR: 89 (22 Dec 2017 05:05) (89 - 111)  BP: 114/74 (22 Dec 2017 05:05) (114/74 - 145/87)  BP(mean): --  RR: 16 (22 Dec 2017 05:05) (16 - 18)  SpO2: 96% (22 Dec 2017 05:05) (94% - 100%)    CONSTITUTIONAL:                                                                          WNL  NEURO:                                                                                             WNL                      EYES:                                                                                                  WNL  ENMT:                                                                                                WNL  CV:                                                                                                      WNL  RESPIRATORY:                                                                                  WNL  GI:                                                                                                       WNL  : MERIDA + / -                                                                                 WNL  MUSKULOSKELETAL:                                                                       WNL  SKIN / BREAST:                                                                                 WNL                                                          LABS:                        12.7   9.4   )-----------( 222      ( 22 Dec 2017 08:11 )             38.9     12-21    144  |  105  |  14  ----------------------------<  106<H>  3.7   |  23  |  0.69    Ca    9.4      21 Dec 2017 15:58    TPro  7.2  /  Alb  4.0  /  TBili  0.4  /  DBili  x   /  AST  25  /  ALT  34  /  AlkPhos  117  12-21    PT/INR - ( 21 Dec 2017 15:58 )   PT: 11.1 sec;   INR: 1.00          PTT - ( 21 Dec 2017 15:58 )  PTT:34.1 sec    CARDIAC MARKERS ( 21 Dec 2017 15:58 )  x     / <0.01 ng/mL / 73 U/L / x     / <1.0 ng/mL          RADIOLOGY & ADDITIONAL STUDIES:  < from: Xray Esophagram (12.21.17 @ 18:15) >    No passage of contrast from the distal esophagus into the stomach. The   patient regurgitated the contrast material and the study could not be   completed. No definite hiatal hernia was identified. Recommend CT of the   chest to rule out obstructing mass.      < end of copied text >  < from: Xray Chest 2 Views PA/Lat (12.21.17 @ 16:55) >  IMPRESSION:  No active disease in the chest.    Mild elevation of the left hemidiaphragm, of unclear etiology.    Previous hiatal hernia is not identified on the current exam. Correlate   with patient's surgical history.    < end of copied text > Surgeon: Carlyle Siegel    Requesting Physician: Dr. Gleason    HISTORY OF PRESENT ILLNESS:  This is a 62 y/o female, known to Dr. Espinosa who performed her hernia repair in 2014.  We were notified that the patient is in the hospital with esophageal issues and will follow with the primary team while she is here.      From H&P:  She is a 61 year old female with a PMHx of a strangulated hiatal hernia s/p repair several years ago, DM, HLD, osteoporosis and GERD who presented to the ED with two days of dysphagia. The patient reports noticing difficulty swallowing food two days ago that has since progressed to liquids today. Today she ate two pieces of small steak and immediately felt that something was stuck in her throat. She came to the ED and immediately vomited up one piece and complained that she still felt something stuck in her chest. The patient also described a pain in her back that she thinks is related to the food.     Current HPI: An esophagram revealed no passage of contrast after the distal esophagus into the stomach.  Working diagnosis of food impaction.  Plan is for EGD today [facility].  Currently, she denies SOB, chest pain, N/V/D.  She still c/o dysphagia with solids and liquids.  She states she is nervous about what is going on, but otherwise feels well.  Denies fever/chills.     PAST MEDICAL & SURGICAL HISTORY:  Renal colic  Hypercholesterolemia  Osteoporosis  Pemphigus vulgaris  Reflux  Diabetes  H/O hernia repair  History of cholecystectomy      MEDICATIONS  (STANDING):  dextrose 5%. 1000 milliLiter(s) (50 mL/Hr) IV Continuous <Continuous>  dextrose 50% Injectable 12.5 Gram(s) IV Push once  dextrose 50% Injectable 25 Gram(s) IV Push once  dextrose 50% Injectable 25 Gram(s) IV Push once  heparin  Injectable 5000 Unit(s) SubCutaneous every 8 hours  insulin lispro (HumaLOG) corrective regimen sliding scale   SubCutaneous Before meals and at bedtime  levothyroxine Injectable 56 MICROGram(s) IV Push <User Schedule>  LORazepam   Injectable 0.5 milliGRAM(s) IV Push once  sodium chloride 0.9%. 1000 milliLiter(s) (125 mL/Hr) IV Continuous <Continuous>    MEDICATIONS  (PRN):  dextrose Gel 1 Dose(s) Oral once PRN Blood Glucose LESS THAN 70 milliGRAM(s)/deciliter  glucagon  Injectable 1 milliGRAM(s) IntraMuscular once PRN Glucose LESS THAN 70 milligrams/deciliter  glucagon  Injectable 0.5 milliGRAM(s) IV Push every 1 hour PRN nausea, vomiting, globus sensation  ondansetron Injectable 4 milliGRAM(s) IV Push every 6 hours PRN Nausea and/or Vomiting      Allergies    propylthiouracil (Hives)    Intolerances        SOCIAL HISTORY:  Current smoker, 10 cig/day.   Denies drug use.      FAMILY HISTORY:  No pertinent family history in first degree relatives      Review of Systems  CONSTITUTIONAL:  Fevers / chills, sweats, fatigue, weight loss, weight gain                                    NEGATIVE  NEURO:  parathesias, seizures, syncope, confusion                                                                               NEGATIVE  EYES:  Blurry vision, discharge, pain, loss of vision                                                                                  NEGATIVE  ENMT:  Difficulty hearing, vertigo, dysphagia, epistaxis, recent dental work                                     NEGATIVE  CV:  Chest pain, palpitations, BARROW, orthopnea                                                                                         NEGATIVE  RESPIRATORY:  Wheezing, SOB, cough / sputum, hemoptysis                                                              NEGATIVE  GI:  +Dysphagia.  Nausea, vomiting, diarrhea, constipation, melena                                                                       POSITIVE  : Hematuria, dysuria, urgency, incontinence                                                                                       NEGATIVE  MUSKULOSKELETAL:  arthritis, joint swelling, muscle weakness                                                           NEGATIVE  SKIN/BREAST:  rash, itching, anoop loss, masses                                                                                            NEGATIVE  PSYCH:  depresion, anxiety, suicidal ideation                                                                                             NEGATIVE  HEME/LYMPH:  bruises easily, enlarged lymph nodes, tender lymph nodes                                        NEGATIVE  ENDOCRINE:  cold intolerance, heat intolerance, polydipsia                                                                   NEGATIVE    PHYSICAL EXAM  Vital Signs Last 24 Hrs  T(C): 36.7 (22 Dec 2017 05:05), Max: 36.7 (21 Dec 2017 19:37)  T(F): 98 (22 Dec 2017 05:05), Max: 98.1 (21 Dec 2017 21:26)  HR: 89 (22 Dec 2017 05:05) (89 - 111)  BP: 114/74 (22 Dec 2017 05:05) (114/74 - 145/87)  BP(mean): --  RR: 16 (22 Dec 2017 05:05) (16 - 18)  SpO2: 96% (22 Dec 2017 05:05) (94% - 100%)    CONSTITUTIONAL:                                                                       NAD, lying comfortably in bed.   NEURO:                                                                                           No focal deficits                    EYES:                                                                                                  WNL  ENMT:                                                                                                WNL  CV:                                                                                                      S1S2 regular.  No murmur heard  RESPIRATORY:                                                                                 Clear B/L.   GI:                                                                                                       Soft, non-tender  : MERIDA + / -                                                                                 No merida  MUSKULOSKELETAL:                                                                       WNL  SKIN / BREAST:                                                                                 WNL  Ext: Warm and well perfused with distal pulses intact.                                                           LABS:                        12.7   9.4   )-----------( 222      ( 22 Dec 2017 08:11 )             38.9     12-21    144  |  105  |  14  ----------------------------<  106<H>  3.7   |  23  |  0.69    Ca    9.4      21 Dec 2017 15:58    TPro  7.2  /  Alb  4.0  /  TBili  0.4  /  DBili  x   /  AST  25  /  ALT  34  /  AlkPhos  117  12-21    PT/INR - ( 21 Dec 2017 15:58 )   PT: 11.1 sec;   INR: 1.00          PTT - ( 21 Dec 2017 15:58 )  PTT:34.1 sec    CARDIAC MARKERS ( 21 Dec 2017 15:58 )  x     / <0.01 ng/mL / 73 U/L / x     / <1.0 ng/mL          RADIOLOGY & ADDITIONAL STUDIES:  < from: Xray Esophagram (12.21.17 @ 18:15) >    No passage of contrast from the distal esophagus into the stomach. The   patient regurgitated the contrast material and the study could not be   completed. No definite hiatal hernia was identified. Recommend CT of the   chest to rule out obstructing mass.      < end of copied text >  < from: Xray Chest 2 Views PA/Lat (12.21.17 @ 16:55) >  IMPRESSION:  No active disease in the chest.    Mild elevation of the left hemidiaphragm, of unclear etiology.    Previous hiatal hernia is not identified on the current exam. Correlate   with patient's surgical history.    < end of copied text >

## 2017-12-22 NOTE — DISCHARGE NOTE ADULT - CARE PROVIDER_API CALL
Doc Meza), Gastroenterology  [address]  [address]  [place], [place] [address]  Phone: [phone]  Fax: [phone] Pernell Guillermo (MD), Gastroenterology; Internal Medicine  [address]  [address]  [place], [place] [address]  Phone: [phone]  Fax: [phone]

## 2017-12-22 NOTE — CONSULT NOTE ADULT - PROBLEM SELECTOR RECOMMENDATION 9
Patient is known to Dr. Espinosa and we will follow along as courtesy. If any surgical issues should arise related to her esophagus/stomach please notify us.      Plan is for EGD today with GI team for likely food impaction at the distal esophagus.  We will follow the results of that.    Other medical management per primary team.

## 2017-12-22 NOTE — PROGRESS NOTE ADULT - PROBLEM SELECTOR PLAN 1
Patient is comfortable, tolerating secretions and without any respiratory distress. Etiology includes strictures from long-standing GERD, autoimmune process (pemphigus), obstruction from recurrent hiatal hernia or a new mass. Other etiologies include Schatzki ring, esophageal web, achalasia or spasm  -NPO, aspiration precautions  -0.5mg glucagon q1hr PRN  -Zofran PRN (QTc 447)  -F/u GI, F/u EGD

## 2017-12-22 NOTE — DISCHARGE NOTE ADULT - ADDITIONAL INSTRUCTIONS
Follow up with ISAC Meza by calling [phone] to make an appointment as soon as possible. Follow up with GI Dr. Pernell Guillermo by calling [phone] to make an appointment as soon as possible.

## 2017-12-22 NOTE — DISCHARGE NOTE ADULT - MEDICATION SUMMARY - MEDICATIONS TO TAKE
I will START or STAY ON the medications listed below when I get home from the hospital:    Aspir 81 oral delayed release tablet  -- 1 tab(s) by mouth once a day  -- Indication: For Prophylaxis    traZODone  -- 50  by mouth once a day (at bedtime)  -- Indication: For Depression    nortriptyline 25 mg oral capsule  --  by mouth   -- Indication: For Depression    Victoza  -- 6 milligram(s) subcutaneous once a day  -- Indication: For Diabetes    simvastatin  -- 40  by mouth once a day  -- Indication: For Hypercholesterolemia    lysine 1000 mg oral tablet  -- 1 tab(s) by mouth once a day  -- Indication: For Supplement    Acidophilus oral tablet  -- 2 tab(s) by mouth once a day  -- Indication: For Supplement    omeprazole  -- 20  by mouth once a day  -- Indication: For Reflux    Synthroid  -- 112  by mouth once a day  -- Indication: For Hypothyroid    Multiple Vitamins oral tablet  -- 1 tab(s) by mouth once a day  -- Indication: For Supplement    Vitamin D3 1000 intl units oral tablet  -- 1 tab(s) by mouth once a day  -- Indication: For Supplement

## 2017-12-22 NOTE — PROGRESS NOTE ADULT - ASSESSMENT
61F PMH strangulated hiatal hernia s/p repair several years ago, Pempigus Vulgaris (Dx 10 yrs ago), hypothyroidism, DM, HLD, osteoporosis and GERD admitted for dysphagia

## 2017-12-22 NOTE — DISCHARGE NOTE ADULT - HOSPITAL COURSE
61F PMH strangulated hiatal hernia s/p repair several years ago, hypothyroidism, DM, HLD, osteoporosis and GERD presented to the ED with two days of dysphagia. The patient reports noticing difficulty swallowing food two days ago that has since progressed to liquids today. Today she ate two pieces of small steak and immediately felt that something was stuck in her throat. In the ED, VS were T 97.8 HR 93 /89 RR 16 O2 100 on RA. Xray esophagram revealed no passage of contrast from the distal esophagus into the stomach. She regurgitated the contrast material so the study could not be completed. No definite hiatal hernia was identified. Her symptoms worsened immediately after esophagram but improved with IV glucagon. GI performed EGD with findings of esophagitis and esophageal erosions; biopsies taken to r/o eosinophilic esophagitis. Patient was discharged on home medications including Omeprazole 20mg daily and referred for follow up with Dr. Doc Meza.

## 2017-12-22 NOTE — PROGRESS NOTE ADULT - SUBJECTIVE AND OBJECTIVE BOX
INTERVAL HPI/OVERNIGHT EVENTS: CRAIG    SUBJECTIVE: Patient seen and examined at bedside. Anxious to have EGD, but retching has decreased overnight. Vomited clear liquid around 3 AM. Has mild sore throat. No pain. States that 1 of the 2 pieces of meat that got stuck in throat have come up, but not the other.    ROS:  CV: Denies chest pain  Resp: Denies SOB  GI: Denies abdominal pain, diarrhea, nausea, vomiting  ID: Denies fevers, chills    OBJECTIVE:    VITALS  Vital Signs Last 24 Hrs  T(C): 36.6 (22 Dec 2017 09:25), Max: 36.7 (21 Dec 2017 19:37)  T(F): 97.9 (22 Dec 2017 09:25), Max: 98.1 (21 Dec 2017 21:26)  HR: 80 (22 Dec 2017 09:25) (80 - 111)  BP: 119/82 (22 Dec 2017 09:25) (114/74 - 145/87)  BP(mean): --  RR: 17 (22 Dec 2017 09:25) (16 - 18)  SpO2: 96% (22 Dec 2017 09:25) (94% - 100%)    I&O's Summary    21 Dec 2017 07:01  -  22 Dec 2017 07:00  --------------------------------------------------------  IN: 975 mL / OUT: 0 mL / NET: 975 mL    CAPILLARY BLOOD GLUCOSE    POCT Blood Glucose.: 75 mg/dL (22 Dec 2017 07:37)  POCT Blood Glucose.: 91 mg/dL (21 Dec 2017 21:05)  POCT Blood Glucose.: 114 mg/dL (21 Dec 2017 15:37)    PHYSICAL EXAM  General: middle aged woman standing next to bed appearing anxious but in NAD  Eyes: +strabismus; EOM grossly intact; no scleral icterus  ENMT: MMM, no pharyngeal erythema/exudate  Neck: Supple; no masses or obvious thyroid enlargement on palpation  Respiratory: CTAB; no W/R/R auscultated; good air movement  Cardiovascular: RRR; S1/S2  Gastrointestinal: Soft; NTND without guarding; bowel sounds present  Extremities: WWP; no edema; radial/pedal pulses palpable  Neurological:  CNII-XII grossly intact    MEDICATIONS  (STANDING):  dextrose 5%. 1000 milliLiter(s) (50 mL/Hr) IV Continuous <Continuous>  dextrose 50% Injectable 12.5 Gram(s) IV Push once  dextrose 50% Injectable 25 Gram(s) IV Push once  dextrose 50% Injectable 25 Gram(s) IV Push once  heparin  Injectable 5000 Unit(s) SubCutaneous every 8 hours  insulin lispro (HumaLOG) corrective regimen sliding scale   SubCutaneous Before meals and at bedtime  levothyroxine Injectable 56 MICROGram(s) IV Push <User Schedule>  LORazepam   Injectable 0.5 milliGRAM(s) IV Push once  sodium chloride 0.9%. 1000 milliLiter(s) (125 mL/Hr) IV Continuous <Continuous>    MEDICATIONS  (PRN):  dextrose Gel 1 Dose(s) Oral once PRN Blood Glucose LESS THAN 70 milliGRAM(s)/deciliter  glucagon  Injectable 1 milliGRAM(s) IntraMuscular once PRN Glucose LESS THAN 70 milligrams/deciliter  glucagon  Injectable 0.5 milliGRAM(s) IV Push every 1 hour PRN nausea, vomiting, globus sensation  ondansetron Injectable 4 milliGRAM(s) IV Push every 6 hours PRN Nausea and/or Vomiting      LABS                        12.7   9.4   )-----------( 222      ( 22 Dec 2017 08:11 )             38.9     12-22    146<H>  |  107  |  17  ----------------------------<  71  3.6   |  25  |  0.70    Ca    8.4      22 Dec 2017 08:11  Mg     2.1     12-22    TPro  7.2  /  Alb  4.0  /  TBili  0.4  /  DBili  x   /  AST  25  /  ALT  34  /  AlkPhos  117  12-21    LIVER FUNCTIONS - ( 21 Dec 2017 15:58 )  Alb: 4.0 g/dL / Pro: 7.2 g/dL / ALK PHOS: 117 U/L / ALT: 34 U/L / AST: 25 U/L / GGT: x           PT/INR - ( 22 Dec 2017 08:11 )   PT: 12.5 sec;   INR: 1.12          PTT - ( 22 Dec 2017 08:11 )  PTT:33.0 sec    CARDIAC MARKERS ( 21 Dec 2017 15:58 )  x     / <0.01 ng/mL / 73 U/L / x     / <1.0 ng/mL          ALLERGIES:  propylthiouracil (Hives)      RADIOLOGY and Additional Tests reviewed.

## 2017-12-22 NOTE — DISCHARGE NOTE ADULT - INSTRUCTIONS
Please resume a mechanical soft diet until you are re-evaluated by a GI doctor.  Foods in puréed and mechanical soft diets have a smoother consistency than regular foods. They require very little or no chewing at all to swallow. A puréed diet is made up of foods that require no chewing, such as mashed potatoes and pudding. Other foods may be blended or strained to make them the right consistency. Liquids, such as broth, milk, juice, or water may be added to foods to make them the right consistency.  A mechanical soft diet is made up of foods that require less chewing than in a regular diet. People on this diet can tolerate a variety of consistencies. Chopped, ground, and puréed foods are included, as well as foods that break apart easily without a knife.    If you experience any of the following signs or symptoms during or after swallowing, you should contact your doctor:    Coughing  Food particles lodging in your mouth or throat  Breathing problems  Wet voice or excessive phlegm  Lung infection (pneumonia)

## 2017-12-22 NOTE — PROGRESS NOTE ADULT - PROBLEM SELECTOR PLAN 8
F: IV NS 125cc/hr  E: Replete electrolytes PRN K > 4, Mg > 2   N: NPO for EGD    HSQ  Dispo: 4Uris  FULL CODE

## 2017-12-22 NOTE — DISCHARGE NOTE ADULT - CARE PROVIDERS DIRECT ADDRESSES
[email or web address]

## 2017-12-22 NOTE — DISCHARGE NOTE ADULT - PLAN OF CARE
Discharge home You were seen and evaluated for trouble swallowing. You had an endoscopy procedure that revealed inflammation in your esophagus. Please resume eating on a pureed-mechanical soft diet as described above. Take your Omeprazole 20mg daily. Follow up with GI Dr. Doc Meza by calling [phone] to make an appointment as soon as possible. Continue the pureed-mechanical soft diet until you are told otherwise by the GI Doctor. Please resume your regular home medications. You were seen and evaluated for trouble swallowing. You had an endoscopy procedure that revealed inflammation in your esophagus. Please resume eating on a pureed-mechanical soft diet as described above. Take your Omeprazole 20mg daily. Follow up with GI Dr. Pernell Guillermo by calling [phone] to make an appointment as soon as possible. Continue the pureed-mechanical soft diet until you are told otherwise by the GI Doctor.

## 2017-12-22 NOTE — DISCHARGE NOTE ADULT - CARE PLAN
Principal Discharge DX:	Dysphagia, unspecified type  Goal:	Discharge home  Instructions for follow-up, activity and diet:	You were seen and evaluated for trouble swallowing. You had an endoscopy procedure that revealed inflammation in your esophagus. Please resume eating on a pureed-mechanical soft diet as described above. Take your Omeprazole 20mg daily. Follow up with GI Dr. Doc Meza by calling [phone] to make an appointment as soon as possible. Continue the pureed-mechanical soft diet until you are told otherwise by the GI Doctor.  Secondary Diagnosis:	Diabetes  Instructions for follow-up, activity and diet:	Please resume your regular home medications.  Secondary Diagnosis:	Hypercholesterolemia  Instructions for follow-up, activity and diet:	Please resume your regular home medications.  Secondary Diagnosis:	Hypothyroid  Instructions for follow-up, activity and diet:	Please resume your regular home medications.  Secondary Diagnosis:	Pemphigus vulgaris  Instructions for follow-up, activity and diet:	Please resume your regular home medications.  Secondary Diagnosis:	Reflux  Instructions for follow-up, activity and diet:	Please resume your regular home medications. Principal Discharge DX:	Dysphagia, unspecified type  Goal:	Discharge home  Instructions for follow-up, activity and diet:	You were seen and evaluated for trouble swallowing. You had an endoscopy procedure that revealed inflammation in your esophagus. Please resume eating on a pureed-mechanical soft diet as described above. Take your Omeprazole 20mg daily. Follow up with GI Dr. Pernell Guillermo by calling [phone] to make an appointment as soon as possible. Continue the pureed-mechanical soft diet until you are told otherwise by the GI Doctor.  Secondary Diagnosis:	Diabetes  Instructions for follow-up, activity and diet:	Please resume your regular home medications.  Secondary Diagnosis:	Hypercholesterolemia  Instructions for follow-up, activity and diet:	Please resume your regular home medications.  Secondary Diagnosis:	Hypothyroid  Instructions for follow-up, activity and diet:	Please resume your regular home medications.  Secondary Diagnosis:	Pemphigus vulgaris  Instructions for follow-up, activity and diet:	Please resume your regular home medications.  Secondary Diagnosis:	Reflux  Instructions for follow-up, activity and diet:	Please resume your regular home medications.

## 2017-12-26 LAB — SURGICAL PATHOLOGY STUDY: SIGNIFICANT CHANGE UP

## 2017-12-27 DIAGNOSIS — E03.9 HYPOTHYROIDISM, UNSPECIFIED: ICD-10-CM

## 2017-12-27 DIAGNOSIS — E11.9 TYPE 2 DIABETES MELLITUS WITHOUT COMPLICATIONS: ICD-10-CM

## 2017-12-27 DIAGNOSIS — L10.0 PEMPHIGUS VULGARIS: ICD-10-CM

## 2017-12-27 DIAGNOSIS — E78.00 PURE HYPERCHOLESTEROLEMIA, UNSPECIFIED: ICD-10-CM

## 2017-12-27 DIAGNOSIS — Z90.49 ACQUIRED ABSENCE OF OTHER SPECIFIED PARTS OF DIGESTIVE TRACT: ICD-10-CM

## 2017-12-27 DIAGNOSIS — R13.10 DYSPHAGIA, UNSPECIFIED: ICD-10-CM

## 2017-12-27 DIAGNOSIS — K44.9 DIAPHRAGMATIC HERNIA WITHOUT OBSTRUCTION OR GANGRENE: ICD-10-CM

## 2017-12-27 DIAGNOSIS — K22.10 ULCER OF ESOPHAGUS WITHOUT BLEEDING: ICD-10-CM

## 2017-12-27 DIAGNOSIS — Z79.82 LONG TERM (CURRENT) USE OF ASPIRIN: ICD-10-CM

## 2017-12-27 DIAGNOSIS — F17.210 NICOTINE DEPENDENCE, CIGARETTES, UNCOMPLICATED: ICD-10-CM

## 2017-12-27 DIAGNOSIS — K21.9 GASTRO-ESOPHAGEAL REFLUX DISEASE WITHOUT ESOPHAGITIS: ICD-10-CM

## 2017-12-27 DIAGNOSIS — K29.70 GASTRITIS, UNSPECIFIED, WITHOUT BLEEDING: ICD-10-CM

## 2017-12-27 DIAGNOSIS — Z88.8 ALLERGY STATUS TO OTHER DRUGS, MEDICAMENTS AND BIOLOGICAL SUBSTANCES: ICD-10-CM

## 2017-12-27 DIAGNOSIS — M81.0 AGE-RELATED OSTEOPOROSIS WITHOUT CURRENT PATHOLOGICAL FRACTURE: ICD-10-CM

## 2018-01-09 ENCOUNTER — APPOINTMENT (OUTPATIENT)
Dept: THORACIC SURGERY | Facility: CLINIC | Age: 63
End: 2018-01-09
Payer: COMMERCIAL

## 2018-01-23 ENCOUNTER — APPOINTMENT (OUTPATIENT)
Dept: THORACIC SURGERY | Facility: CLINIC | Age: 63
End: 2018-01-23
Payer: COMMERCIAL

## 2018-01-23 VITALS
OXYGEN SATURATION: 97 % | SYSTOLIC BLOOD PRESSURE: 108 MMHG | WEIGHT: 150 LBS | RESPIRATION RATE: 16 BRPM | BODY MASS INDEX: 25.61 KG/M2 | HEIGHT: 64 IN | DIASTOLIC BLOOD PRESSURE: 74 MMHG | HEART RATE: 102 BPM

## 2018-01-23 DIAGNOSIS — F17.200 NICOTINE DEPENDENCE, UNSPECIFIED, UNCOMPLICATED: ICD-10-CM

## 2018-01-23 PROCEDURE — 99213 OFFICE O/P EST LOW 20 MIN: CPT

## 2018-02-12 ENCOUNTER — APPOINTMENT (OUTPATIENT)
Dept: THORACIC SURGERY | Facility: CLINIC | Age: 63
End: 2018-02-12
Payer: COMMERCIAL

## 2018-02-12 VITALS
BODY MASS INDEX: 26.09 KG/M2 | HEART RATE: 107 BPM | SYSTOLIC BLOOD PRESSURE: 129 MMHG | DIASTOLIC BLOOD PRESSURE: 86 MMHG | RESPIRATION RATE: 17 BRPM | OXYGEN SATURATION: 97 % | WEIGHT: 152 LBS

## 2018-02-12 PROCEDURE — 99213 OFFICE O/P EST LOW 20 MIN: CPT

## 2018-04-16 ENCOUNTER — RX RENEWAL (OUTPATIENT)
Age: 63
End: 2018-04-16

## 2018-08-10 ENCOUNTER — RX RENEWAL (OUTPATIENT)
Age: 63
End: 2018-08-10

## 2018-08-10 RX ORDER — FAMOTIDINE 20 MG/1
20 TABLET, FILM COATED ORAL
Qty: 30 | Refills: 0 | Status: ACTIVE | COMMUNITY
Start: 2018-01-23 | End: 1900-01-01

## 2018-08-24 ENCOUNTER — EMERGENCY (EMERGENCY)
Facility: HOSPITAL | Age: 63
LOS: 1 days | Discharge: HOME | End: 2018-08-24
Attending: EMERGENCY MEDICINE
Payer: COMMERCIAL

## 2018-08-24 VITALS
OXYGEN SATURATION: 97 % | HEART RATE: 89 BPM | TEMPERATURE: 98 F | RESPIRATION RATE: 18 BRPM | DIASTOLIC BLOOD PRESSURE: 80 MMHG | SYSTOLIC BLOOD PRESSURE: 124 MMHG

## 2018-08-24 VITALS
HEART RATE: 104 BPM | DIASTOLIC BLOOD PRESSURE: 86 MMHG | RESPIRATION RATE: 18 BRPM | HEIGHT: 63 IN | SYSTOLIC BLOOD PRESSURE: 130 MMHG | WEIGHT: 147.93 LBS | OXYGEN SATURATION: 96 % | TEMPERATURE: 97 F

## 2018-08-24 DIAGNOSIS — Z98.89 OTHER SPECIFIED POSTPROCEDURAL STATES: Chronic | ICD-10-CM

## 2018-08-24 LAB
ALBUMIN SERPL ELPH-MCNC: 3.8 G/DL — SIGNIFICANT CHANGE UP (ref 3.3–5)
ALP SERPL-CCNC: 122 U/L — HIGH (ref 40–120)
ALT FLD-CCNC: 22 U/L — SIGNIFICANT CHANGE UP (ref 10–45)
ANION GAP SERPL CALC-SCNC: 13 MMOL/L — SIGNIFICANT CHANGE UP (ref 5–17)
AST SERPL-CCNC: 24 U/L — SIGNIFICANT CHANGE UP (ref 10–40)
BASOPHILS # BLD AUTO: 0.1 K/UL — SIGNIFICANT CHANGE UP (ref 0–0.2)
BASOPHILS NFR BLD AUTO: 1 % — SIGNIFICANT CHANGE UP (ref 0–2)
BILIRUB SERPL-MCNC: 0.5 MG/DL — SIGNIFICANT CHANGE UP (ref 0.2–1.2)
BUN SERPL-MCNC: 11 MG/DL — SIGNIFICANT CHANGE UP (ref 7–23)
CALCIUM SERPL-MCNC: 9.3 MG/DL — SIGNIFICANT CHANGE UP (ref 8.4–10.5)
CHLORIDE SERPL-SCNC: 105 MMOL/L — SIGNIFICANT CHANGE UP (ref 96–108)
CO2 SERPL-SCNC: 26 MMOL/L — SIGNIFICANT CHANGE UP (ref 22–31)
CREAT SERPL-MCNC: 0.59 MG/DL — SIGNIFICANT CHANGE UP (ref 0.5–1.3)
EOSINOPHIL # BLD AUTO: 0.1 K/UL — SIGNIFICANT CHANGE UP (ref 0–0.5)
EOSINOPHIL NFR BLD AUTO: 1.3 % — SIGNIFICANT CHANGE UP (ref 0–6)
GLUCOSE SERPL-MCNC: 93 MG/DL — SIGNIFICANT CHANGE UP (ref 70–99)
HCT VFR BLD CALC: 42.2 % — SIGNIFICANT CHANGE UP (ref 34.5–45)
HGB BLD-MCNC: 13.5 G/DL — SIGNIFICANT CHANGE UP (ref 11.5–15.5)
LYMPHOCYTES # BLD AUTO: 2.2 K/UL — SIGNIFICANT CHANGE UP (ref 1–3.3)
LYMPHOCYTES # BLD AUTO: 27.9 % — SIGNIFICANT CHANGE UP (ref 13–44)
MCHC RBC-ENTMCNC: 26 PG — LOW (ref 27–34)
MCHC RBC-ENTMCNC: 32 GM/DL — SIGNIFICANT CHANGE UP (ref 32–36)
MCV RBC AUTO: 81.2 FL — SIGNIFICANT CHANGE UP (ref 80–100)
MONOCYTES # BLD AUTO: 0.5 K/UL — SIGNIFICANT CHANGE UP (ref 0–0.9)
MONOCYTES NFR BLD AUTO: 6.5 % — SIGNIFICANT CHANGE UP (ref 2–14)
NEUTROPHILS # BLD AUTO: 5 K/UL — SIGNIFICANT CHANGE UP (ref 1.8–7.4)
NEUTROPHILS NFR BLD AUTO: 63.3 % — SIGNIFICANT CHANGE UP (ref 43–77)
PLATELET # BLD AUTO: 261 K/UL — SIGNIFICANT CHANGE UP (ref 150–400)
POTASSIUM SERPL-MCNC: 3.6 MMOL/L — SIGNIFICANT CHANGE UP (ref 3.5–5.3)
POTASSIUM SERPL-SCNC: 3.6 MMOL/L — SIGNIFICANT CHANGE UP (ref 3.5–5.3)
PROT SERPL-MCNC: 6.7 G/DL — SIGNIFICANT CHANGE UP (ref 6–8.3)
RBC # BLD: 5.19 M/UL — SIGNIFICANT CHANGE UP (ref 3.8–5.2)
RBC # FLD: 16.7 % — HIGH (ref 10.3–14.5)
SODIUM SERPL-SCNC: 144 MMOL/L — SIGNIFICANT CHANGE UP (ref 135–145)
WBC # BLD: 7.9 K/UL — SIGNIFICANT CHANGE UP (ref 3.8–10.5)
WBC # FLD AUTO: 7.9 K/UL — SIGNIFICANT CHANGE UP (ref 3.8–10.5)

## 2018-08-24 PROCEDURE — 93971 EXTREMITY STUDY: CPT

## 2018-08-24 PROCEDURE — 73610 X-RAY EXAM OF ANKLE: CPT

## 2018-08-24 PROCEDURE — 73630 X-RAY EXAM OF FOOT: CPT | Mod: 26,LT

## 2018-08-24 PROCEDURE — 85027 COMPLETE CBC AUTOMATED: CPT

## 2018-08-24 PROCEDURE — 99284 EMERGENCY DEPT VISIT MOD MDM: CPT | Mod: 25

## 2018-08-24 PROCEDURE — 96365 THER/PROPH/DIAG IV INF INIT: CPT | Mod: XU

## 2018-08-24 PROCEDURE — 28490 TREAT BIG TOE FRACTURE: CPT | Mod: TA

## 2018-08-24 PROCEDURE — 73590 X-RAY EXAM OF LOWER LEG: CPT

## 2018-08-24 PROCEDURE — 73590 X-RAY EXAM OF LOWER LEG: CPT | Mod: 26,LT

## 2018-08-24 PROCEDURE — 80053 COMPREHEN METABOLIC PANEL: CPT

## 2018-08-24 PROCEDURE — 73610 X-RAY EXAM OF ANKLE: CPT | Mod: 26,50

## 2018-08-24 PROCEDURE — 73630 X-RAY EXAM OF FOOT: CPT

## 2018-08-24 PROCEDURE — 28490 TREAT BIG TOE FRACTURE: CPT | Mod: 54

## 2018-08-24 PROCEDURE — 93971 EXTREMITY STUDY: CPT | Mod: 26

## 2018-08-24 RX ORDER — SODIUM CHLORIDE 9 MG/ML
500 INJECTION INTRAMUSCULAR; INTRAVENOUS; SUBCUTANEOUS ONCE
Qty: 0 | Refills: 0 | Status: COMPLETED | OUTPATIENT
Start: 2018-08-24 | End: 2018-08-24

## 2018-08-24 RX ORDER — CEFAZOLIN SODIUM 1 G
1000 VIAL (EA) INJECTION ONCE
Qty: 0 | Refills: 0 | Status: COMPLETED | OUTPATIENT
Start: 2018-08-24 | End: 2018-08-24

## 2018-08-24 RX ORDER — IBUPROFEN 200 MG
400 TABLET ORAL ONCE
Qty: 0 | Refills: 0 | Status: COMPLETED | OUTPATIENT
Start: 2018-08-24 | End: 2018-08-24

## 2018-08-24 RX ORDER — ACETAMINOPHEN 500 MG
975 TABLET ORAL ONCE
Qty: 0 | Refills: 0 | Status: COMPLETED | OUTPATIENT
Start: 2018-08-24 | End: 2018-08-24

## 2018-08-24 RX ORDER — CEPHALEXIN 500 MG
1 CAPSULE ORAL
Qty: 28 | Refills: 0 | OUTPATIENT
Start: 2018-08-24 | End: 2018-08-30

## 2018-08-24 RX ADMIN — SODIUM CHLORIDE 500 MILLILITER(S): 9 INJECTION INTRAMUSCULAR; INTRAVENOUS; SUBCUTANEOUS at 14:21

## 2018-08-24 RX ADMIN — Medication 1000 MILLIGRAM(S): at 15:15

## 2018-08-24 RX ADMIN — Medication 975 MILLIGRAM(S): at 15:38

## 2018-08-24 RX ADMIN — SODIUM CHLORIDE 500 MILLILITER(S): 9 INJECTION INTRAMUSCULAR; INTRAVENOUS; SUBCUTANEOUS at 15:35

## 2018-08-24 RX ADMIN — Medication 400 MILLIGRAM(S): at 13:59

## 2018-08-24 RX ADMIN — Medication 100 MILLIGRAM(S): at 14:21

## 2018-08-24 RX ADMIN — Medication 400 MILLIGRAM(S): at 13:14

## 2018-08-24 NOTE — ED PROVIDER NOTE - PHYSICAL EXAMINATION
NAD, Tachycardia Afebrile, No facial or scalp tender, No spinal tender, No rib or CVA tender, No pelvic or hip tender, + Left ant tibia vertical 7 cm laceration wound with dry scab and cellulitis around wound, warmth and tender, No discharge. + Calf tender with mild swelling, N/V- intact, + Right lat mal ankle mild swelling with tender, No left ankle tender, Full ROMs of ankles, + Left lat calcaneal superficial ecchymosis without tender, + Left great toe with mild swelling/ tender, ecchymosis to the tip, full ROMs, N/V- intact. NAD, mild Tachycardia Afebrile, No facial or scalp tender, No spinal tender, No rib or CVA tender, No pelvic or hip tender, + Left ant tibia vertical 7 cm laceration wound with dry scab and cellulitis measuring approx 2cm outside wound margin around wound, warmth and tender, No discharge.  No crepitus, no fluctuance, pain is not out of proportion.  + Calf tender with mild swelling, N/V- intact, + Right lat mal ankle mild swelling with tender, No left ankle tender, Full ROMs of ankles, + Left lat calcaneal superficial ecchymosis without tender, + Left great toe with mild swelling/ tender, ecchymosis to the tip, full ROMs, N/V- intact. NAD, mild Tachycardia Afebrile, No facial or scalp tender, No spinal tender, No rib or CVA tender, No pelvic or hip tender, + Left ant tibia vertical 10 cm laceration wound with dry scab and cellulitis measuring approx 2cm outside wound margin around wound, warmth and tender, No discharge.  No crepitus, no fluctuance, pain is not out of proportion.  + Calf tender with mild swelling, N/V- intact, + Right lat mal ankle mild swelling with tender, No left ankle tender, Full ROMs of ankles, + Left lat calcaneal superficial ecchymosis without tender, + Left great toe with mild swelling/ tender, ecchymosis to the tip, full ROMs, N/V- intact.

## 2018-08-24 NOTE — ED PROVIDER NOTE - CARE PLAN
Principal Discharge DX:	Cellulitis and abscess of left leg  Goal:	anterior Principal Discharge DX:	Cellulitis and abscess of left leg  Goal:	anterior  Secondary Diagnosis:	Toe fracture, left Principal Discharge DX:	Cellulitis and abscess of left leg  Goal:	anterior  Assessment and plan of treatment:	distal phalanx, hallux  Secondary Diagnosis:	Toe fracture, left

## 2018-08-24 NOTE — ED ADULT NURSE NOTE - NSIMPLEMENTINTERV_GEN_ALL_ED
Implemented All Fall Risk Interventions:  [place] to call system. Call bell, personal items and telephone within reach. Instruct patient to call for assistance. Room bathroom lighting operational. Non-slip footwear when patient is off stretcher. Physically safe environment: no spills, clutter or unnecessary equipment. Stretcher in lowest position, wheels locked, appropriate side rails in place. Provide visual cue, wrist band, yellow gown, etc. Monitor gait and stability. Monitor for mental status changes and reorient to person, place, and time. Review medications for side effects contributing to fall risk. Reinforce activity limits and safety measures with patient and family.

## 2018-08-24 NOTE — ED ADULT NURSE NOTE - OBJECTIVE STATEMENT
63 y/o female seen in [facility], pt  c/o left lower leg pain s/p fall on 8/13/18. 61 y/o female seen in [facility] c/o left lower leg pain, swelling and redness around wound s/p fall on 8/13/18. Pt stated she slipped and fell on left leg when she came out of work.  Pt went to urgent care next day and was prescribed antibiotic.  Pt reports the redness and swelling has been mildly improving since  but she felt severe tender to the calf area with swelling.  Denies LOC, head injury, N/V/D,  fever, chills, chest pain, SOB, leg weakness.  Left anterior tibia with 10cm lac wound with dry scab surrounded by swelling/ redness and warmth.  No discharge.  Left great toe with mild swelling and ecchymosis.

## 2018-08-24 NOTE — ED PROVIDER NOTE - MEDICAL DECISION MAKING DETAILS
Anterior LLE cellulitis 2/2 abrasion suffered approx 10 days ago.  Initially failed bactrim, has been improving in terms of erythema and swelling since starting keflex PO (only taking BID).  Pain has been persistent, not acutely worsening, no evidence of nec fasc or compartment syndrome on exam, no evidence of abscess or drainable collection.  No f/c, nontoxic appearing.  Symptoms stated to be improving, likely does not require CDU for abx but will eval for deep venous thrombosis.  XRs to eval for osteo though unlikely.  Labs, IV abx x 1, reassess.  If evidence of osteo on imaging or signficiant leukocytosis/bandemia will likely pursue CDU obs v admission.  Reassess.  --BMM

## 2018-08-24 NOTE — ED PROVIDER NOTE - OBJECTIVE STATEMENT
61yo female pt with PMHx of DM, HLD c/o right low leg pain s/p fall on 8/13/18. 63yo female pt with PMHx of DM, HLD c/o left low leg pain/ swelling and redness around wound s/p fall on 8/13/18. Pt stated she slipped on rain and fell on left leg when she came out of work. Pt went to  next day and prescribed Bactrim after UTDed Tdap, also found left great toe fx after x-rays. Urgent Care stitched antibiotic to Keflex 500mg (BID) 3days ago. + Reported the redness and swelling has been mildly improved, but she felt severe tender to the calf area with swelling. Denies LOC, head injury or dizziness. Denies headache, blurry vision, N/V. Denies fever, chills or cough/ congestion. Denies CP/SOB/ABD or back pain. Denies pelvic or hip pain. Denies sensory changes or weakness to extremities. 63yo female pt with PMHx of DM, HLD c/o left low leg pain/ swelling and redness around wound s/p fall on 8/13/18. Pt stated she slipped on rain and fell on left leg when she came out of work. Pt went to  next day and prescribed Bactrim after UTDed Tdap, also found left great toe fx after x-rays. Urgent Care stitched antibiotic to Keflex 500mg (BID) 3days ago based on outpt wound culture results. + Reported the redness and swelling has been mildly improving since starting the keflex but she felt severe tender to the calf area with swelling. Denies LOC, head injury or dizziness. Denies headache, blurry vision, N/V. Denies fever, chills or cough/ congestion. Denies CP/SOB/ABD or back pain. Denies pelvic or hip pain. Denies sensory changes or weakness to extremities.

## 2018-09-10 ENCOUNTER — RX RENEWAL (OUTPATIENT)
Age: 63
End: 2018-09-10

## 2018-09-10 ENCOUNTER — MEDICATION RENEWAL (OUTPATIENT)
Age: 63
End: 2018-09-10

## 2018-12-11 ENCOUNTER — RX RENEWAL (OUTPATIENT)
Age: 63
End: 2018-12-11

## 2018-12-11 RX ORDER — FAMOTIDINE 20 MG/1
20 TABLET, FILM COATED ORAL
Qty: 30 | Refills: 2 | Status: ACTIVE | COMMUNITY
Start: 2018-09-10 | End: 1900-01-01

## 2019-01-03 PROBLEM — R13.10 DYSPHAGIA: Status: ACTIVE | Noted: 2018-01-25

## 2019-01-03 NOTE — HISTORY OF PRESENT ILLNESS
[FreeTextEntry1] : 64 y/o F, current smoker, w/ hx of DM, hypothyroidism, HLD and hiatal hernia s/p FB, EGD, LAP repair of paraesophageal hernia, Nissen fundoplication on 9/11/13 at [facility]/.\par Pt presented to ED on 12/21/17 c/o dysphagia, Xray esophagram revealed no passage of contrast from the distal esophagus into the stomach, she regurgitate the contrast so the study could not be completed. \par EGD performed by GI with findings of esophagitis and esophageal erosions. \par \par CT Chest on 1/27/18:\par - no definite evidence of hiatal hernia\par - 4mm subpleural nodule within Lt pleural thickening, c/w post-inflammatory disease\par \par Barium swallow on 1/30/18:\par - mild esophageal dysmotility\par - filling defect at the gastric fundus likely representing post-op (Nissen Fundoplication) changes\par - no definite evidence of hiatal hernia\par

## 2019-01-03 NOTE — ASSESSMENT
[FreeTextEntry1] : \par \par I have reviewed the patient's medical records and diagnostic images at time of this office consultation and have made the following recommendation:\par 1.\par \par

## 2019-01-03 NOTE — CONSULT LETTER
[Dear  ___] : Dear  [unfilled], [Consult Letter:] : I had the pleasure of evaluating your patient, [unfilled]. [( Thank you for referring [unfilled] for consultation for _____ )] : Thank you for referring [unfilled] for consultation for [unfilled] [Please see my note below.] : Please see my note below. [Consult Closing:] : Thank you very much for allowing me to participate in the care of this patient.  If you have any questions, please do not hesitate to contact me. [Sincerely,] : Sincerely, [FreeTextEntry2] : Oamr Ramos MD \par  [FreeTextEntry3] : Carlyle Espinosa MD, MPH \par System Director of Thoracic Surgery \par Director of Comprehensive Lung [facility] [place] \par Professor Cardiovascular & Thoracic Surgery  \par [employer] at [facility]\par

## 2019-01-08 ENCOUNTER — APPOINTMENT (OUTPATIENT)
Dept: THORACIC SURGERY | Facility: CLINIC | Age: 64
End: 2019-01-08

## 2019-01-08 DIAGNOSIS — R13.10 DYSPHAGIA, UNSPECIFIED: ICD-10-CM

## 2019-01-08 DIAGNOSIS — K44.9 DIAPHRAGMATIC HERNIA W/OUT OBSTRUCTION OR GANGRENE: ICD-10-CM

## 2019-02-27 ENCOUNTER — RX RENEWAL (OUTPATIENT)
Age: 64
End: 2019-02-27

## 2019-03-19 ENCOUNTER — RX RENEWAL (OUTPATIENT)
Age: 64
End: 2019-03-19

## 2022-08-11 NOTE — ED PROVIDER NOTE - EYES NEGATIVE STATEMENT, MLM
Ainsley Jeffries called requesting a refill of the below medication which has been pended for you:     Requested Prescriptions     Pending Prescriptions Disp Refills    solifenacin (VESICARE) 5 MG tablet 45 tablet 11     Sig: Take 2 tablets by mouth daily       Last Appointment Date: 8/9/2022  Next Appointment Date: 11/9/2022    Allergies   Allergen Reactions    Latex Swelling and Rash     Had reaction at dentist office-redness, swelling, and rash  Other reaction(s): rash  Had reaction at dentist office-redness, swelling, and rash    Ceftriaxone Other (See Comments)     Other reaction(s): Other (see comments)  Fever, hallucinations was transported via ambulance [address]   Other reaction(s): high fever  Fever, hallucinations was transported via ambulance [place]     Pravastatin      Myalgias    Vancomycin Other (See Comments)     Other reaction(s):  Other (See Comments)  Red warm hands and itching  Red warm hands and itching
no discharge, no irritation, no pain, no redness, and no visual changes.

## 2025-09-04 ENCOUNTER — APPOINTMENT (OUTPATIENT)
Dept: THORACIC SURGERY | Facility: CLINIC | Age: 70
End: 2025-09-04

## 2025-09-04 DIAGNOSIS — R13.10 DYSPHAGIA, UNSPECIFIED: ICD-10-CM

## 2025-09-04 DIAGNOSIS — K44.9 DIAPHRAGMATIC HERNIA W/OUT OBSTRUCTION OR GANGRENE: ICD-10-CM

## 2025-09-04 PROCEDURE — 99203 OFFICE O/P NEW LOW 30 MIN: CPT
